# Patient Record
Sex: MALE | Race: WHITE | NOT HISPANIC OR LATINO
[De-identification: names, ages, dates, MRNs, and addresses within clinical notes are randomized per-mention and may not be internally consistent; named-entity substitution may affect disease eponyms.]

---

## 2020-07-06 ENCOUNTER — RESULT REVIEW (OUTPATIENT)
Age: 65
End: 2020-07-06

## 2020-11-11 PROBLEM — Z00.00 ENCOUNTER FOR PREVENTIVE HEALTH EXAMINATION: Status: ACTIVE | Noted: 2020-11-11

## 2020-11-13 ENCOUNTER — APPOINTMENT (OUTPATIENT)
Dept: CT IMAGING | Facility: CLINIC | Age: 65
End: 2020-11-13
Payer: SELF-PAY

## 2020-11-13 ENCOUNTER — OUTPATIENT (OUTPATIENT)
Dept: OUTPATIENT SERVICES | Facility: HOSPITAL | Age: 65
LOS: 1 days | End: 2020-11-13

## 2020-11-13 PROCEDURE — 75571 CT HRT W/O DYE W/CA TEST: CPT | Mod: 26

## 2021-09-13 ENCOUNTER — APPOINTMENT (OUTPATIENT)
Dept: PULMONOLOGY | Facility: CLINIC | Age: 66
End: 2021-09-13
Payer: COMMERCIAL

## 2021-09-13 VITALS
OXYGEN SATURATION: 95 % | BODY MASS INDEX: 26.49 KG/M2 | SYSTOLIC BLOOD PRESSURE: 124 MMHG | DIASTOLIC BLOOD PRESSURE: 83 MMHG | HEIGHT: 75 IN | HEART RATE: 71 BPM | TEMPERATURE: 97.7 F | WEIGHT: 213 LBS

## 2021-09-13 DIAGNOSIS — G25.9 EXTRAPYRAMIDAL AND MOVEMENT DISORDER, UNSPECIFIED: ICD-10-CM

## 2021-09-13 DIAGNOSIS — Z84.1 FAMILY HISTORY OF DISORDERS OF KIDNEY AND URETER: ICD-10-CM

## 2021-09-13 DIAGNOSIS — Z82.49 FAMILY HISTORY OF ISCHEMIC HEART DISEASE AND OTHER DISEASES OF THE CIRCULATORY SYSTEM: ICD-10-CM

## 2021-09-13 DIAGNOSIS — K20.0 EOSINOPHILIC ESOPHAGITIS: ICD-10-CM

## 2021-09-13 PROCEDURE — 99204 OFFICE O/P NEW MOD 45 MIN: CPT

## 2021-09-14 PROBLEM — Z84.1 FAMILY HISTORY OF RENAL FAILURE: Status: ACTIVE | Noted: 2021-09-13

## 2021-09-14 PROBLEM — Z82.49 FAMILY HISTORY OF CARDIAC DISORDER: Status: ACTIVE | Noted: 2021-09-13

## 2021-09-14 PROBLEM — G25.9 ABNORMAL LEG MOVEMENT: Status: ACTIVE | Noted: 2021-09-14

## 2021-09-14 PROBLEM — K20.0 EOSINOPHILIC ESOPHAGITIS: Status: RESOLVED | Noted: 2021-09-13 | Resolved: 2021-09-14

## 2021-09-14 RX ORDER — BLOOD SUGAR DIAGNOSTIC
100 STRIP MISCELLANEOUS
Refills: 0 | Status: ACTIVE | COMMUNITY

## 2021-09-14 RX ORDER — OMEPRAZOLE 20 MG/1
20 CAPSULE, DELAYED RELEASE ORAL
Refills: 0 | Status: ACTIVE | COMMUNITY

## 2021-09-14 RX ORDER — ROSUVASTATIN CALCIUM 10 MG/1
10 TABLET, FILM COATED ORAL
Refills: 0 | Status: ACTIVE | COMMUNITY

## 2021-09-14 NOTE — CONSULT LETTER
[Dear  ___] : Dear  [unfilled], [Consult Letter:] : I had the pleasure of evaluating your patient, [unfilled]. [Please see my note below.] : Please see my note below. [Consult Closing:] : Thank you very much for allowing me to participate in the care of this patient.  If you have any questions, please do not hesitate to contact me. [Sincerely,] : Sincerely, [FreeTextEntry3] : Candice Chiang MD\par \par [employer] [facility]\par Pulmonary, Critical Care, and Sleep Medicine\par

## 2021-09-14 NOTE — PHYSICAL EXAM
[General Appearance - Well Developed] : well developed [Normal Conjunctiva] : the conjunctiva exhibited no abnormalities [Retrognathia] : retrognathia [Neck Appearance] : the appearance of the neck was normal [Thyroid Diffuse Enlargement] : the thyroid was not enlarged [Apical Impulse] : the apical impulse was normal [Respiration, Rhythm And Depth] : normal respiratory rhythm and effort [Auscultation Breath Sounds / Voice Sounds] : lungs were clear to auscultation bilaterally [Chest Palpation] : palpation of the chest revealed no abnormalities [Lungs Percussion] : the lungs were normal to percussion [Nail Clubbing] : no clubbing  or cyanosis of the fingernails [Musculoskeletal - Swelling] : no joint swelling seen [No Focal Deficits] : no focal deficits [Oriented To Time, Place, And Person] : oriented to person, place, and time [IV] : IV [FreeTextEntry1] : nail fungal infection on hand digits

## 2021-09-14 NOTE — REVIEW OF SYSTEMS
[Snoring] : snoring [A.M. Dry Mouth] : a.m. dry mouth [Leg Dysesthesias] : leg dysesthesias [Moderate] : RLS symptom severity: moderate [Negative] : Musculoskeletal [EDS: ESS=____] : no daytime somnolence [Fatigue] : no fatigue [Recent Wt Gain (___ Lbs)] : no recent weight gain [Nasal Congestion] : no nasal congestion [Postnasal Drip] : no postnasal drip [Witnessed Apneas] : no witnessed apnea [Shortness Of Breath] : no shortness of breath [Chest Pain] : no chest pain [Palpitations] : no palpitations [Obesity] : not obese [Thyroid Disease] : no thyroid disease [Anemia] : no anemia [Unusual Sleep Behavior] : no unusual sleep behavior [Hypersomnolence] : not sleeping much more than usual [Sleep Paralysis] : no sleep paralysis

## 2021-09-14 NOTE — HISTORY OF PRESENT ILLNESS
[Snoring] : snoring [Awakes Unrefreshed] : awakening unrefreshed [Awakening With Dry Mouth] : awakening with dry mouth [Lower Extremity Discomfort] : lower extremity discomfort in evening or at bedtime [To Bed: ___] : ~he/she~ goes to bed at [unfilled] [Arises: ___] : arises at [unfilled] [Sleep Onset Latency: ___ minutes] : sleep onset latency of [unfilled] minutes reported [Nocturnal Awakenings: ___] : ~he/she~ typically has [unfilled] nocturnal awakenings [WASO: ___] : Wake time after sleep onset is [unfilled] [Daytime Sleep: ___] : daytime sleep: [unfilled] [Witnessed Apneas] : no witnessed sleep apnea [Frequent Nocturnal Awakening] : no nocturnal awakening [Unintentional Sleep while Active] : no unintentional sleep while active [Unintentional Sleep While Inactive] : no unintentional sleep while inactive [Awakes with Headache] : no headache upon awakening [Recent  Weight Gain] : no recent weight gain [Daytime Somnolence] : no daytime somnolence [Unusual Sleep Behavior] : no unusual sleep behavior [Hypersomnolence] : no hypersomnolence [Cataplexy] : no cataplexy [Sleep Paralysis] : no sleep paralysis [Hypnagogic Hallucinations] : no hallucinations when falling asleep [Hypnopompic Hallucinations] : no hallucinations when awakening [ESS] : 4 [FreeTextEntry1] : Hx of appendectomy \par He is allergic to penicillin , sulfa , bee stings, codeine. \par Never smoker\par social alcohol\par

## 2021-09-14 NOTE — ASSESSMENT
[FreeTextEntry1] : 66 yo M never smoker referred by Dr. GREG Nunez referred for sleep complaints. Clinical history concerning for periodic limb movement disorder. No RLS as per clinical history. Low suspicion for HERIBERTO. Referred to the [facility] for a PSG. Follow up after PSG is resulted.

## 2021-10-11 ENCOUNTER — APPOINTMENT (OUTPATIENT)
Dept: SLEEP CENTER | Facility: HOME HEALTH | Age: 66
End: 2021-10-11
Payer: COMMERCIAL

## 2021-10-11 ENCOUNTER — OUTPATIENT (OUTPATIENT)
Dept: OUTPATIENT SERVICES | Facility: HOSPITAL | Age: 66
LOS: 1 days | End: 2021-10-11
Payer: COMMERCIAL

## 2021-10-11 PROCEDURE — 95800 SLP STDY UNATTENDED: CPT

## 2021-10-11 PROCEDURE — 95800 SLP STDY UNATTENDED: CPT | Mod: 26

## 2021-10-13 DIAGNOSIS — G47.33 OBSTRUCTIVE SLEEP APNEA (ADULT) (PEDIATRIC): ICD-10-CM

## 2021-10-25 ENCOUNTER — APPOINTMENT (OUTPATIENT)
Dept: PULMONOLOGY | Facility: CLINIC | Age: 66
End: 2021-10-25
Payer: COMMERCIAL

## 2021-10-25 VITALS
OXYGEN SATURATION: 96 % | BODY MASS INDEX: 26.73 KG/M2 | WEIGHT: 215 LBS | HEIGHT: 75 IN | SYSTOLIC BLOOD PRESSURE: 110 MMHG | TEMPERATURE: 97.5 F | DIASTOLIC BLOOD PRESSURE: 73 MMHG | HEART RATE: 88 BPM

## 2021-10-25 PROCEDURE — 99214 OFFICE O/P EST MOD 30 MIN: CPT

## 2021-10-25 NOTE — PHYSICAL EXAM
[General Appearance - Well Developed] : well developed [General Appearance - Well Nourished] : well nourished [Normal Conjunctiva] : the conjunctiva exhibited no abnormalities [Retrognathia] : retrognathia [IV] : IV [Neck Appearance] : the appearance of the neck was normal [] : the neck was supple [Apical Impulse] : the apical impulse was normal [Heart Rate And Rhythm] : heart rate was normal and rhythm regular [Exaggerated Use Of Accessory Muscles For Inspiration] : no accessory muscle use [Auscultation Breath Sounds / Voice Sounds] : lungs were clear to auscultation bilaterally [Involuntary Movements] : no involuntary movements were seen [Nail Clubbing] : no clubbing of the fingernails [No Focal Deficits] : no focal deficits [Oriented To Time, Place, And Person] : oriented to person, place, and time [Impaired Insight] : insight and judgment were intact

## 2021-10-25 NOTE — HISTORY OF PRESENT ILLNESS
[Snoring] : snoring [Witnessed Apneas] : no witnessed sleep apnea [Frequent Nocturnal Awakening] : no nocturnal awakening [Unintentional Sleep while Active] : no unintentional sleep while active [Unintentional Sleep While Inactive] : no unintentional sleep while inactive [Awakes Unrefreshed] : awakening unrefreshed [Awakes with Headache] : no headache upon awakening [Awakening With Dry Mouth] : awakening with dry mouth [Recent  Weight Gain] : no recent weight gain [Daytime Somnolence] : no daytime somnolence [Unusual Sleep Behavior] : no unusual sleep behavior [Hypersomnolence] : no hypersomnolence [Cataplexy] : no cataplexy [Sleep Paralysis] : no sleep paralysis [Hypnagogic Hallucinations] : no hallucinations when falling asleep [Hypnopompic Hallucinations] : no hallucinations when awakening [Lower Extremity Discomfort] : no lower extremity discomfort in evening or at bedtime [To Bed: ___] : ~he/she~ goes to bed at [unfilled] [Arises: ___] : arises at [unfilled] [Sleep Onset Latency: ___ minutes] : sleep onset latency of [unfilled] minutes reported [Nocturnal Awakenings: ___] : ~he/she~ typically has [unfilled] nocturnal awakenings [WASO: ___] : Wake time after sleep onset is [unfilled] [Daytime Sleep: ___] : daytime sleep: [unfilled] [ESS] : 4 [FreeTextEntry1] : \par social alcohol\par

## 2021-10-25 NOTE — CONSULT LETTER
[Dear  ___] : Dear  [unfilled], [Courtesy Letter:] : I had the pleasure of seeing your patient, [unfilled], in my office today. [Please see my note below.] : Please see my note below. [Consult Closing:] : Thank you very much for allowing me to participate in the care of this patient.  If you have any questions, please do not hesitate to contact me. [Sincerely,] : Sincerely, [FreeTextEntry3] : Candice Chiang MD\par \par [employer] at [facility]\par Pulmonary, Critical Care, and Sleep Medicine\par

## 2021-10-25 NOTE — ASSESSMENT
[FreeTextEntry1] : REVIEWED\par HST 10/2021 AHI 17.1; mariana O2 saturation 91; RDI 20.4\par \par 64yo man following up HST. Initial concern for PLMs given history but PSG denied by insurance. HST was authorized and is c/w moderate HERIBERTO. HERIBERTO can be associated with PLMs (does not have RLS). Moderate HERIBERTO is indicated for treatment irrespective of leg movements. The benefits of HERIBERTO treatment in improving cardiac, neurologic, cognitive, and mortality outcomes were discussed. PAP and mandibular advancement therapy were discussed in detail. Would like to pursue PAP therapy. Equipment ordered; DME is [facility]; Airsense 10 or 11; APAP set 5 to 20 cm H2O. Adherence goal is at least 4 hours of use per night on 70% of nights with an AHI of less than 10 events per hour. The ramifications of HERIBERTO and its treatment were discussed in detail. If leg movements continue after HERIBERTO treatment will need PSG with PAP in place for further diagnosis. Follow up within 6 weeks of use or sooner if needed.\par

## 2022-08-01 ENCOUNTER — APPOINTMENT (OUTPATIENT)
Dept: PULMONOLOGY | Facility: CLINIC | Age: 67
End: 2022-08-01

## 2022-08-01 VITALS
WEIGHT: 217 LBS | HEART RATE: 67 BPM | HEIGHT: 75 IN | SYSTOLIC BLOOD PRESSURE: 117 MMHG | BODY MASS INDEX: 26.98 KG/M2 | DIASTOLIC BLOOD PRESSURE: 75 MMHG | TEMPERATURE: 98.1 F | OXYGEN SATURATION: 98 %

## 2022-08-01 PROCEDURE — 99213 OFFICE O/P EST LOW 20 MIN: CPT

## 2022-08-02 RX ORDER — FAMOTIDINE 20 MG/1
20 TABLET, FILM COATED ORAL
Qty: 90 | Refills: 0 | Status: ACTIVE | COMMUNITY
Start: 2022-03-28

## 2022-08-02 NOTE — CONSULT LETTER
[Dear  ___] : Dear  [unfilled], [Courtesy Letter:] : I had the pleasure of seeing your patient, [unfilled], in my office today. [Please see my note below.] : Please see my note below. [Consult Closing:] : Thank you very much for allowing me to participate in the care of this patient.  If you have any questions, please do not hesitate to contact me. [Sincerely,] : Sincerely, [FreeTextEntry3] : Candice Chiang MD\par \par [place] [employer] at [facility]\par Pulmonary, Critical Care, and Sleep Medicine\par  [DrGhulam  ___] : Dr. MONTERO

## 2022-08-02 NOTE — PHYSICAL EXAM
[General Appearance - Well Developed] : well developed [General Appearance - Well Nourished] : well nourished [Normal Conjunctiva] : the conjunctiva exhibited no abnormalities [Retrognathia] : retrognathia [IV] : IV [Neck Appearance] : the appearance of the neck was normal [Apical Impulse] : the apical impulse was normal [Heart Rate And Rhythm] : heart rate was normal and rhythm regular [] : no respiratory distress [Respiration, Rhythm And Depth] : normal respiratory rhythm and effort [Exaggerated Use Of Accessory Muscles For Inspiration] : no accessory muscle use [Auscultation Breath Sounds / Voice Sounds] : lungs were clear to auscultation bilaterally [Abnormal Walk] : normal gait [Nail Clubbing] : no clubbing of the fingernails [Skin Color & Pigmentation] : normal skin color and pigmentation [Oriented To Time, Place, And Person] : oriented to person, place, and time [Impaired Insight] : insight and judgment were intact

## 2022-08-02 NOTE — ASSESSMENT
[FreeTextEntry1] : REVIEWED\par HST 10/2021 AHI 17.1; mariana O2 saturation 91; RDI 20.4\par Airview 6/2022 to 8/2022: 12% use; therapeutic AHI 0.6\par \par 64yo man following up for moderate HERIBERTO. Has had inconsistent use due to vacations. When using PAP there is objective efficacy and adherence. Discussed strategies to increase use.  The benefits of HERIBERTO treatment in improving cardiac, neurologic, cognitive, and mortality outcomes were discussed. Follow up in 3 months.

## 2022-08-02 NOTE — HISTORY OF PRESENT ILLNESS
[FreeTextEntry1] : 65 yr M never smoker, bifascicular block, HLD, EoE and DNS presents for evaluation of leg twitching. He has noticed leg twitching just around the time of bedtime and falling asleep but does not prevent him from falling asleep. Wife has told him that he does move his legs at night to the point that she would ask him to sleep on the couch. He denies EDS, ESS is 4. He may snore sometimes but not sure about it. Does not endorse bed displacement. \par \par 10/25/2021\par Had HST and would like to discuss results.\par \par 8/1/2022\par Has been doing well on PAP. Subjective benefit perceived. Has not been consistent with use due to travel. \par \par DME [facility]\par Machine Airsense 11\par Mask [ESS] : 5

## 2023-02-01 ENCOUNTER — APPOINTMENT (OUTPATIENT)
Dept: PULMONOLOGY | Facility: CLINIC | Age: 68
End: 2023-02-01
Payer: COMMERCIAL

## 2023-02-01 VITALS
BODY MASS INDEX: 27.1 KG/M2 | HEIGHT: 75 IN | TEMPERATURE: 98 F | SYSTOLIC BLOOD PRESSURE: 106 MMHG | WEIGHT: 218 LBS | OXYGEN SATURATION: 97 % | HEART RATE: 98 BPM | DIASTOLIC BLOOD PRESSURE: 66 MMHG

## 2023-02-01 PROCEDURE — 99214 OFFICE O/P EST MOD 30 MIN: CPT

## 2023-02-01 NOTE — PHYSICAL EXAM
[General Appearance - Well Developed] : well developed [Normal Appearance] : normal appearance [General Appearance - Well Nourished] : well nourished [Normal Conjunctiva] : the conjunctiva exhibited no abnormalities [Normal Oropharynx] : normal oropharynx [Neck Appearance] : the appearance of the neck was normal [Apical Impulse] : the apical impulse was normal [Heart Sounds] : normal S1 and S2 [Respiration, Rhythm And Depth] : normal respiratory rhythm and effort [Auscultation Breath Sounds / Voice Sounds] : lungs were clear to auscultation bilaterally [Nail Clubbing] : no clubbing of the fingernails [Impaired Insight] : insight and judgment were intact

## 2023-02-02 NOTE — CONSULT LETTER
[Dear  ___] : Dear  [unfilled], [Courtesy Letter:] : I had the pleasure of seeing your patient, [unfilled], in my office today. [Please see my note below.] : Please see my note below. [Consult Closing:] : Thank you very much for allowing me to participate in the care of this patient.  If you have any questions, please do not hesitate to contact me. [Sincerely,] : Sincerely, [FreeTextEntry3] : Candice Chiang MD\par \par [employer] [employer] at [facility]\par Pulmonary, Critical Care, and Sleep Medicine\par

## 2023-02-02 NOTE — HISTORY OF PRESENT ILLNESS
[FreeTextEntry1] : 65 yr M never smoker, bifascicular block, HLD, EoE and DNS presents for evaluation of leg twitching. He has noticed leg twitching just around the time of bedtime and falling asleep but does not prevent him from falling asleep. Wife has told him that he does move his legs at night to the point that she would ask him to sleep on the couch. He denies EDS, ESS is 4. He may snore sometimes but not sure about it. Does not endorse bed displacement. \par \par 10/25/2021\par Had HST and would like to discuss results.\par \par 8/1/2022\par Has been doing well on PAP. Subjective benefit perceived. Has not been consistent with use due to travel. \par \par 2/1/2023\par Patient had his Airsense removed due to non adherence. He reports that he was utilizing his APAP 20-30% of the evenings per his own report. No data is available for review as his account was suspended. He now reports again increase in snoring and ESS is again 4.  [ESS] : 4

## 2023-02-02 NOTE — ASSESSMENT
[FreeTextEntry1] : REVIEWED\par HST 10/2021 AHI 17.1; mariana O2 saturation 91; RDI 20.4\par \par \par 1 - HERIBERTO, moderate \par - Patient with HST demonstrating moderate sleep apnea with AHI of 17.1. While he perceived benefit during use, his Airsense device was taken due to patient non adherence. We reviewed with the patient that in order to qualify for a new device that he would require recertification with a PSG and we would resubmit to restart APAP treatment. Alternatively, we discussed possible referral for mandibular advancement device. We discussed the cardiovascular risks associated with untreated HERIBERTO which he was previously aware of. He prefers to undergo PSG and treatment again with APAP. PSG will allow for simultaneous evaluation of nocturnal limb movements which do not affect his sleep. \par \par RTC 4 weeks\par

## 2023-02-02 NOTE — REVIEW OF SYSTEMS
[EDS: ESS=____] : daytime somnolence: ESS=[unfilled] [Irresistible urge to move legs] : irresistible urge to move legs because of lower extremity discomfort [Negative] : Musculoskeletal [LE discomfort relieved by movement] : lower extremity discomfort not relieved by movement [Sleep Disturbances due to LE symptoms] : ~T no sleep disturbances due to lower extremity symptoms

## 2023-02-07 ENCOUNTER — OUTPATIENT (OUTPATIENT)
Dept: OUTPATIENT SERVICES | Facility: HOSPITAL | Age: 68
LOS: 1 days | End: 2023-02-07
Payer: COMMERCIAL

## 2023-02-07 ENCOUNTER — APPOINTMENT (OUTPATIENT)
Dept: SLEEP CENTER | Facility: HOSPITAL | Age: 68
End: 2023-02-07

## 2023-02-07 DIAGNOSIS — G47.33 OBSTRUCTIVE SLEEP APNEA (ADULT) (PEDIATRIC): ICD-10-CM

## 2023-02-07 PROCEDURE — 95810 POLYSOM 6/> YRS 4/> PARAM: CPT

## 2023-02-07 PROCEDURE — 95810 POLYSOM 6/> YRS 4/> PARAM: CPT | Mod: 26

## 2023-03-10 ENCOUNTER — APPOINTMENT (OUTPATIENT)
Dept: PULMONOLOGY | Facility: CLINIC | Age: 68
End: 2023-03-10
Payer: COMMERCIAL

## 2023-03-10 PROCEDURE — 99442: CPT

## 2023-03-10 NOTE — CONSULT LETTER
[Dear  ___] : Dear  [unfilled], [Consult Letter:] : I had the pleasure of evaluating your patient, [unfilled]. [Please see my note below.] : Please see my note below. [Consult Closing:] : Thank you very much for allowing me to participate in the care of this patient.  If you have any questions, please do not hesitate to contact me. [Sincerely,] : Sincerely, [FreeTextEntry3] : Candice Chiang MD\par \par [place] [employer] at [facility]\par Pulmonary, Critical Care, and Sleep Medicine\par  [DrGhulam  ___] : Dr. MONTERO

## 2023-03-10 NOTE — ASSESSMENT
[FreeTextEntry1] : REVIEWED\par HST 10/2021 AHI 17.1; mariana O2 saturation 91; RDI 20.4\par PSG 2/16/2023 2/7/2023: AHI 12.8 3%; PLM index 30.2 \par \par Hx of moderate HERIBERTO as per 2021 HST. Equipment taken away due to non adherence. PSG showed mild HERIBERTO with an AHI of 12.8 and a high PLM index in the severe range. The patient is symptomatic with snoring and insomnia nd according to current practice guidelines treatment for mild HERIBERTO is only indicated for symptom management. The benefits of HERIBERTO treatment in improving cardiac, neurologic, cognitive, and mortality outcomes have not been substantiated by research when mild in severity. PAP and mandibular advancement therapy were discussed in detail. Will pursue PAP. Equipment ordered; DME is [facility]; APAP 5 to 20 cm H2O. Adherence goal is more than 4 hours per night on 70% of nights and an AHI of less than 10 events per hour.\par \par For PLMD will start iron supplementation since he is symptomatic. Last ferritin in 2022 was >70ng/mL but there is evidence to support FeSO4 supplementation even if the ferritin is at goal as a trial. Advised on constipation. Will check ferritin at next follow.\par \par Follow up with me within 8 weeks of starting PAP. \par

## 2023-03-10 NOTE — HISTORY OF PRESENT ILLNESS
[Home] : at home, [unfilled] , at the time of the visit. [Medical Office: ([facility])___] : at the medical office located in  [Verbal consent obtained from patient] : the patient, [unfilled] [FreeTextEntry1] : 65 yr M never smoker, bifascicular block, HLD, EoE and DNS presents for evaluation of leg twitching. He has noticed leg twitching just around the time of bedtime and falling asleep but does not prevent him from falling asleep. Wife has told him that he does move his legs at night to the point that she would ask him to sleep on the couch. He denies EDS, ESS is 4. He may snore sometimes but not sure about it. Does not endorse bed displacement. \par \par 10/25/2021\par Had HST and would like to discuss results.\par \par 8/1/2022\par Has been doing well on PAP. Subjective benefit perceived. Has not been consistent with use due to travel. \par \par DME [facility]\par Machine Airsense 11\par Mask\par \par 3/10/2023\par Had PSG to recertify for PAP equipment. Would like to discuss results.  [ESS] : 5

## 2023-07-10 ENCOUNTER — APPOINTMENT (OUTPATIENT)
Dept: PULMONOLOGY | Facility: CLINIC | Age: 68
End: 2023-07-10
Payer: COMMERCIAL

## 2023-07-10 DIAGNOSIS — Z78.9 OTHER SPECIFIED HEALTH STATUS: ICD-10-CM

## 2023-07-10 PROCEDURE — 99213 OFFICE O/P EST LOW 20 MIN: CPT

## 2023-07-10 NOTE — HISTORY OF PRESENT ILLNESS
[FreeTextEntry1] : 65 yr M never smoker, bifascicular block, HLD, EoE and DNS presents for evaluation of leg twitching. He has noticed leg twitching just around the time of bedtime and falling asleep but does not prevent him from falling asleep. Wife has told him that he does move his legs at night to the point that she would ask him to sleep on the couch. He denies EDS, ESS is 4. He may snore sometimes but not sure about it. Does not endorse bed displacement. \par \par 10/25/2021\par Had HST and would like to discuss results.\par \par 8/1/2022\par Has been doing well on PAP. Subjective benefit perceived. Has not been consistent with use due to travel. \par \par DME [facility]\par Machine Airsense 11\par Mask\par \par 3/10/2023\par Had PSG to recertify for PAP equipment. Would like to discuss results. \par \par 7/10/23\par Starting using PAP end of April. Uses 80% of the time for probably 6-7 hours a night. For last three weeks, has had band like tension, more around neck. Doesnt feel its related to PAP - wears full mask. Tested positive for COVID beginning of June, was on paxlovid. Suffered from bad PND and congestion at that time. \par \par DME [facility]\par Machine  AS 11\par Mask: FF [ESS] : 5

## 2023-07-10 NOTE — ASSESSMENT
[FreeTextEntry1] : REVIEWED\par HST 10/2021 AHI 17.1; mariana O2 saturation 91; RDI 20.4\par PSG 2/16/2023 2/7/2023: AHI 12.8 3%; PLM index 30.2 \par AirView Compliance data: 69% >4 hours usage, therapeutic AHI .8,sleep time average 6 hrs 21 min \par \par Hx of moderate HERIBERTO as per 2021 HST. Equipment taken away due to non adherence. Re-authorization PSG showed mild HERIBERTO with an AHI of 12.8 and a high PLM index in the severe range. Treatment was pursued due to symptoms. Has been doing well on PAP and is perceiving benefit. Efficacy and adherence are at goal.\par \par For PLMD c/w iron supplementation since he is symptomatic. Last ferritin in 2022 was >70ng/mL but there is evidence to support FeSO4 supplementation even if the ferritin is at goal as a trial. \par \par Follow up in 6 months, check ferritin at that time.

## 2023-07-10 NOTE — PHYSICAL EXAM
[General Appearance - Well Developed] : well developed [Normal Appearance] : normal appearance [Well Groomed] : well groomed [General Appearance - Well Nourished] : well nourished [No Deformities] : no deformities [General Appearance - In No Acute Distress] : no acute distress [Normal Conjunctiva] : the conjunctiva exhibited no abnormalities [Neck Appearance] : the appearance of the neck was normal [Heart Sounds] : normal S1 and S2 [] : no respiratory distress [Exaggerated Use Of Accessory Muscles For Inspiration] : no accessory muscle use [Oriented To Time, Place, And Person] : oriented to person, place, and time [Impaired Insight] : insight and judgment were intact [Affect] : the affect was normal [Abnormal Walk] : normal gait

## 2023-07-10 NOTE — CONSULT LETTER
[Dear  ___] : Dear  [unfilled], [Courtesy Letter:] : I had the pleasure of seeing your patient, [unfilled], in my office today. [Please see my note below.] : Please see my note below. [Consult Closing:] : Thank you very much for allowing me to participate in the care of this patient.  If you have any questions, please do not hesitate to contact me. [Sincerely,] : Sincerely, [FreeTextEntry3] : Candice Chiang MD\par \par [employer] [employer] at [facility]\par Pulmonary, Critical Care, and Sleep Medicine\par

## 2024-03-12 ENCOUNTER — APPOINTMENT (OUTPATIENT)
Dept: PULMONOLOGY | Facility: CLINIC | Age: 69
End: 2024-03-12
Payer: COMMERCIAL

## 2024-03-12 VITALS
DIASTOLIC BLOOD PRESSURE: 72 MMHG | BODY MASS INDEX: 39.69 KG/M2 | SYSTOLIC BLOOD PRESSURE: 110 MMHG | OXYGEN SATURATION: 99 % | WEIGHT: 224 LBS | HEIGHT: 63 IN | HEART RATE: 60 BPM | TEMPERATURE: 97.4 F

## 2024-03-12 DIAGNOSIS — G47.8 OTHER SLEEP DISORDERS: ICD-10-CM

## 2024-03-12 DIAGNOSIS — G47.61 PERIODIC LIMB MOVEMENT DISORDER: ICD-10-CM

## 2024-03-12 DIAGNOSIS — G47.33 OBSTRUCTIVE SLEEP APNEA (ADULT) (PEDIATRIC): ICD-10-CM

## 2024-03-12 PROCEDURE — G2211 COMPLEX E/M VISIT ADD ON: CPT | Mod: NC,1L

## 2024-03-12 PROCEDURE — 99214 OFFICE O/P EST MOD 30 MIN: CPT | Mod: 25

## 2024-03-14 PROBLEM — G47.8 UNREFRESHED BY SLEEP: Status: ACTIVE | Noted: 2021-09-14

## 2024-03-14 PROBLEM — G47.33 OBSTRUCTIVE SLEEP APNEA: Status: ACTIVE | Noted: 2021-10-25

## 2024-03-14 PROBLEM — G47.61 PERIODIC LIMB MOVEMENT: Status: ACTIVE | Noted: 2023-03-10

## 2024-03-14 NOTE — HISTORY OF PRESENT ILLNESS
[FreeTextEntry1] : 65 yr M never smoker, bi-fascicular block, HLD, EoE and DNS presents for evaluation of leg twitching. He has noticed leg twitching just around the time of bedtime and falling asleep but does not prevent him from falling asleep. Wife has told him that he does move his legs at night to the point that she would ask him to sleep on the couch. He denies EDS, ESS is 4. He may snore sometimes but not sure about it. Does not endorse bed displacement.  10/25/2021 Had HST and would like to discuss results.  8/1/2022 Has been doing well on PAP. Subjective benefit perceived. Has not been consistent with use due to travel.  DME [facility] Machine Airsense 11 Mask  3/10/2023 Had PSG to recertify for PAP equipment. Would like to discuss results.  7/10/23 Starting using PAP end of April. Uses 80% of the time for probably 6-7 hours a night. For last three weeks, has had band like tension, more around neck. Doesnt feel its related to PAP - wears full mask. Tested positive for COVID beginning of June, was on paxlovid. Suffered from bad PND and congestion at that time.  3/12/2024 Has been doing well on PAP. Did not start iron supplementation because he was worried about constipation. Does not perceive leg movements at night and wife tells him they are not overly apparent. TST is about 6- 6/12 hours nightly. Goes to bed at 10PM, reads for 15-30 minutes, wakes up early to go [place] the city (5:30AM) on work days but sleeps in on days that he doesn't have to be at work.   DME [facility] Machine AS 11 Mask: FF [ESS] : 5

## 2024-03-14 NOTE — PHYSICAL EXAM
[General Appearance - Well Developed] : well developed [General Appearance - Well Nourished] : well nourished [] : no respiratory distress [Exaggerated Use Of Accessory Muscles For Inspiration] : no accessory muscle use [Oriented To Time, Place, And Person] : oriented to person, place, and time [Affect] : the affect was normal [Neck Appearance] : the appearance of the neck was normal [Abnormal Walk] : normal gait

## 2024-03-14 NOTE — CONSULT LETTER
[Courtesy Letter:] : I had the pleasure of seeing your patient, [unfilled], in my office today. [Dear  ___] : Dear  [unfilled], [Please see my note below.] : Please see my note below. [Consult Closing:] : Thank you very much for allowing me to participate in the care of this patient.  If you have any questions, please do not hesitate to contact me. [FreeTextEntry3] : Candice Chiang MD  [employer] [facility] Pulmonary, Critical Care, and Sleep Medicine [Sincerely,] : Sincerely,

## 2024-03-14 NOTE — END OF VISIT
[FreeTextEntry3] :   I, Candice Chiang MD, personally performed the evaluation and management (E/M) services for this patient. That E/M includes conducting the clinically appropriate initial history &/or exam, assessing all conditions, and establishing the plan of care. I have also independently reviewed the medical records and imaging at the time of this office visit, and discussed the above mentioned images with interpretations with the patient. Today, CHERIE Lindsey was here to participate in the visit & follow plan of care established by me.

## 2024-03-14 NOTE — ASSESSMENT
[FreeTextEntry1] : REVIEWED HST 10/2021 AHI 17.1; mariana O2 saturation 91; RDI 20.4 PSG 2/16/2023 2/7/2023: AHI 12.8 3%; PLM index 30.2 [employer] Compliance data: 69% >4 hours usage, therapeutic AHI.8, sleep time average 6 hrs 21 min [employer]  Compliance data 2024: 73% >4 hours usage, therapeutic AHI 1.2, sleep time average 6 hrs 28 minutes  Hx of moderate HERIBERTO as per 2021 HST. Equipment taken away due to non-adherence. Re-authorization PSG showed mild HERIBERTO with an AHI of 12.8 and a high PLM index in the severe range. Treatment was pursued due to symptoms. Has been doing well on PAP and is perceiving benefit. Efficacy and adherence are at goal.   Continues to have daytime somnolence. 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 Advised to start iron supplementation and can start colace/senna in the weeks before he starts FeSO4 to prevent constipation. Last ferritin in 2022 was >70ng/mL but there is evidence to support FeSO4 supplementation even if the ferritin is at goal as a trial. Discussed sleep hygiene techniques to improve daytime somnolence, including increasing TST (now 6-6 1/2 hours) and keeping a consistent sleep schedule, eating dinner earlier in the night.   Follow up in 3 months.

## 2025-02-14 ENCOUNTER — APPOINTMENT (OUTPATIENT)
Dept: OTOLARYNGOLOGY | Facility: CLINIC | Age: 70
End: 2025-02-14

## 2025-02-14 ENCOUNTER — APPOINTMENT (OUTPATIENT)
Dept: OTOLARYNGOLOGY | Facility: CLINIC | Age: 70
End: 2025-02-14
Payer: COMMERCIAL

## 2025-02-14 DIAGNOSIS — Z85.828 PERSONAL HISTORY OF OTHER MALIGNANT NEOPLASM OF SKIN: ICD-10-CM

## 2025-02-14 DIAGNOSIS — Z78.9 OTHER SPECIFIED HEALTH STATUS: ICD-10-CM

## 2025-02-14 DIAGNOSIS — H91.13 PRESBYCUSIS, BILATERAL: ICD-10-CM

## 2025-02-14 DIAGNOSIS — Z86.39 PERSONAL HISTORY OF OTHER ENDOCRINE, NUTRITIONAL AND METABOLIC DISEASE: ICD-10-CM

## 2025-02-14 PROCEDURE — 92557 COMPREHENSIVE HEARING TEST: CPT

## 2025-02-14 PROCEDURE — 99203 OFFICE O/P NEW LOW 30 MIN: CPT

## 2025-02-14 PROCEDURE — 92550 TYMPANOMETRY & REFLEX THRESH: CPT | Mod: 52

## 2025-02-14 RX ORDER — OMEPRAZOLE 20 MG/1
TABLET, DELAYED RELEASE ORAL
Refills: 0 | Status: ACTIVE | COMMUNITY

## 2025-02-14 RX ORDER — EZETIMIBE 10 MG/1
TABLET ORAL
Refills: 0 | Status: ACTIVE | COMMUNITY

## 2025-02-27 PROBLEM — H93.299 ABNORMAL AUDITORY PERCEPTION: Status: ACTIVE | Noted: 2025-02-27

## 2025-08-18 ENCOUNTER — APPOINTMENT (OUTPATIENT)
Dept: PULMONOLOGY | Facility: CLINIC | Age: 70
End: 2025-08-18
Payer: COMMERCIAL

## 2025-08-18 VITALS
OXYGEN SATURATION: 97 % | SYSTOLIC BLOOD PRESSURE: 112 MMHG | HEIGHT: 75 IN | HEART RATE: 63 BPM | DIASTOLIC BLOOD PRESSURE: 73 MMHG | BODY MASS INDEX: 26.43 KG/M2 | WEIGHT: 212.56 LBS | TEMPERATURE: 97.3 F

## 2025-08-18 DIAGNOSIS — G47.33 OBSTRUCTIVE SLEEP APNEA (ADULT) (PEDIATRIC): ICD-10-CM

## 2025-08-18 PROCEDURE — 99214 OFFICE O/P EST MOD 30 MIN: CPT
